# Patient Record
Sex: FEMALE | URBAN - METROPOLITAN AREA
[De-identification: names, ages, dates, MRNs, and addresses within clinical notes are randomized per-mention and may not be internally consistent; named-entity substitution may affect disease eponyms.]

---

## 2019-10-30 ENCOUNTER — OFFICE VISIT (OUTPATIENT)
Dept: URBAN - METROPOLITAN AREA CLINIC 23 | Facility: CLINIC | Age: 69
End: 2019-10-30
Payer: COMMERCIAL

## 2019-10-30 PROCEDURE — 99203 OFFICE O/P NEW LOW 30 MIN: CPT | Performed by: OPHTHALMOLOGY

## 2019-10-30 ASSESSMENT — VISUAL ACUITY
OS: 20/60
OD: 20/25

## 2019-10-30 ASSESSMENT — KERATOMETRY
OS: 41.63
OD: 41.75

## 2019-10-30 ASSESSMENT — INTRAOCULAR PRESSURE
OD: 11
OS: 12

## 2019-10-30 NOTE — IMPRESSION/PLAN
Impression: partial Retinal detachment with single break, left eye: H33.012. Mac on Plan: Discussed diagnosis in detail with patient. Advised patient of condition. Consult recommended [[facility] Called over to [facility] [address] to get patient set up for consult and surgery.

## 2019-10-30 NOTE — IMPRESSION/PLAN
Impression: Age-related nuclear cataract, bilateral: H25.13. Condition: established, worsening. Plan: Discussed diagnosis in detail with patient. No surgical treatment currently recommended. The patient will monitor vision changes and contact us with any decrease in vision.

## 2019-10-31 ENCOUNTER — SURGERY (OUTPATIENT)
Dept: URBAN - METROPOLITAN AREA SURGERY 15 | Facility: SURGERY | Age: 69
End: 2019-10-31
Payer: COMMERCIAL

## 2019-10-31 ENCOUNTER — OFFICE VISIT (OUTPATIENT)
Dept: URBAN - METROPOLITAN AREA CLINIC 33 | Facility: CLINIC | Age: 69
End: 2019-10-31
Payer: COMMERCIAL

## 2019-10-31 DIAGNOSIS — H33.012 RETINAL DETACHMENT WITH SINGLE BREAK, LEFT EYE: Primary | ICD-10-CM

## 2019-10-31 PROCEDURE — 92014 COMPRE OPH EXAM EST PT 1/>: CPT | Performed by: OPHTHALMOLOGY

## 2019-10-31 PROCEDURE — 92134 CPTRZ OPH DX IMG PST SGM RTA: CPT | Performed by: OPHTHALMOLOGY

## 2019-10-31 PROCEDURE — 67108 REPAIR DETACHED RETINA: CPT | Performed by: OPHTHALMOLOGY

## 2019-10-31 RX ORDER — OFLOXACIN 3 MG/ML
0.3 % SOLUTION/ DROPS OPHTHALMIC
Qty: 5 | Refills: 3 | Status: INACTIVE
Start: 2019-10-31 | End: 2020-02-11

## 2019-10-31 RX ORDER — PREDNISOLONE ACETATE 10 MG/ML
1 % SUSPENSION/ DROPS OPHTHALMIC
Qty: 10 | Refills: 5 | Status: INACTIVE
Start: 2019-10-31 | End: 2020-02-11

## 2019-10-31 ASSESSMENT — INTRAOCULAR PRESSURE
OD: 13
OS: 13
OS: 13
OD: 13

## 2019-10-31 NOTE — IMPRESSION/PLAN
Impression: Retinal detachment with single break, left eye: H33.012. OS
OS Condition: new problem addtl w/u needed. Vision: vision threatening. Plan: Discussed diagnosis in detail with patient. Discussed risks of progression. Surgical treatment is recommended to repair the retina PPVx. Surgical risks and benefits were discussed, explained and understood by patient. Unable to tell how much vision will be recovered. Discussed gas bubble and post-op care: no traveling, flying or high altitude for approximately 6 - 8 weeks. All questions answered. Patient elects to proceed with recommendation. RL1. Educational material provided to patient. OCT shows stable and attached centrally OS, stable OD Schedule Emergency Surgery today at [facility]. 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

## 2019-11-01 ENCOUNTER — POST-OPERATIVE VISIT (OUTPATIENT)
Dept: URBAN - METROPOLITAN AREA CLINIC 23 | Facility: CLINIC | Age: 69
End: 2019-11-01

## 2019-11-01 ASSESSMENT — INTRAOCULAR PRESSURE
OS: 20
OD: 14

## 2019-11-07 ENCOUNTER — POST-OPERATIVE VISIT (OUTPATIENT)
Dept: URBAN - METROPOLITAN AREA CLINIC 23 | Facility: CLINIC | Age: 69
End: 2019-11-07

## 2019-11-07 DIAGNOSIS — Z09 ENCNTR FOR F/U EXAM AFT TRTMT FOR COND OTH THAN MALIG NEOPLM: Primary | ICD-10-CM

## 2019-11-07 PROCEDURE — 99024 POSTOP FOLLOW-UP VISIT: CPT | Performed by: OPHTHALMOLOGY

## 2019-11-07 ASSESSMENT — INTRAOCULAR PRESSURE
OS: 17
OD: 13

## 2019-12-11 ENCOUNTER — POST-OPERATIVE VISIT (OUTPATIENT)
Dept: URBAN - METROPOLITAN AREA CLINIC 23 | Facility: CLINIC | Age: 69
End: 2019-12-11

## 2019-12-11 PROCEDURE — 99024 POSTOP FOLLOW-UP VISIT: CPT | Performed by: OPHTHALMOLOGY

## 2019-12-11 ASSESSMENT — INTRAOCULAR PRESSURE
OD: 13
OS: 16

## 2020-01-24 ENCOUNTER — POST-OPERATIVE VISIT (OUTPATIENT)
Dept: URBAN - METROPOLITAN AREA CLINIC 23 | Facility: CLINIC | Age: 70
End: 2020-01-24

## 2020-01-24 PROCEDURE — 99024 POSTOP FOLLOW-UP VISIT: CPT | Performed by: OPHTHALMOLOGY

## 2020-01-24 ASSESSMENT — INTRAOCULAR PRESSURE
OS: 14
OD: 13

## 2020-02-11 ENCOUNTER — OFFICE VISIT (OUTPATIENT)
Dept: URBAN - METROPOLITAN AREA CLINIC 23 | Facility: CLINIC | Age: 70
End: 2020-02-11
Payer: COMMERCIAL

## 2020-02-11 DIAGNOSIS — H52.03 HYPERMETROPIA, BILATERAL: Primary | ICD-10-CM

## 2020-02-11 PROCEDURE — 92012 INTRM OPH EXAM EST PATIENT: CPT | Performed by: OPTOMETRIST

## 2020-02-11 ASSESSMENT — KERATOMETRY
OD: 41.75
OS: 41.88

## 2020-02-11 ASSESSMENT — VISUAL ACUITY
OS: 20/100
OD: 20/20

## 2020-02-11 NOTE — IMPRESSION/PLAN
Impression: Hypermetropia, bilateral: H52.03. Plan: Discussed diagnosis in detail with patient. Reassured patient of current condition and treatment. Will continue to observe condition and or symptoms. New glasses Rx was given today.

## 2021-01-21 ENCOUNTER — OFFICE VISIT (OUTPATIENT)
Dept: URBAN - METROPOLITAN AREA CLINIC 23 | Facility: CLINIC | Age: 71
End: 2021-01-21
Payer: MEDICARE

## 2021-01-21 DIAGNOSIS — H11.32 SUBCONJUNCTIVAL HEMORRHAGE OF LEFT EYE: Primary | ICD-10-CM

## 2021-01-21 DIAGNOSIS — H25.13 AGE-RELATED NUCLEAR CATARACT, BILATERAL: ICD-10-CM

## 2021-01-21 PROCEDURE — 99212 OFFICE O/P EST SF 10 MIN: CPT | Performed by: OPTOMETRIST

## 2021-01-21 NOTE — IMPRESSION/PLAN
Impression: Age-related nuclear cataract, bilateral: H25.13. Plan: Cataracts account for the patient's complaints. Discussed all risks, benefits, procedures and recovery. Patient understands changing glasses will not improve vision. Patient desires to have surgery, recommend phacoemulsification with intraocular lens. Patient declined surgery at this time. Ketty Morales

## 2021-01-21 NOTE — IMPRESSION/PLAN
Impression: Subconjunctival hemorrhage of left eye: H11.32. Plan: Discussed diagnosis in detail with patient. Reassured patient of current condition and treatment.

## 2021-12-01 ENCOUNTER — OFFICE VISIT (OUTPATIENT)
Dept: URBAN - METROPOLITAN AREA CLINIC 23 | Facility: CLINIC | Age: 71
End: 2021-12-01
Payer: MEDICARE

## 2021-12-01 DIAGNOSIS — H40.1120 PRIMARY OPEN-ANGLE GLAUCOMA, LEFT EYE, STAGE UNSPECIFIED: ICD-10-CM

## 2021-12-01 DIAGNOSIS — H40.021 OPEN ANGLE WITH BORDERLINE FINDINGS, HIGH RISK, RIGHT EYE: ICD-10-CM

## 2021-12-01 DIAGNOSIS — H31.092 OTHER CHORIORETINAL SCARS, LEFT EYE: ICD-10-CM

## 2021-12-01 PROCEDURE — 92133 CPTRZD OPH DX IMG PST SGM ON: CPT | Performed by: OPTOMETRIST

## 2021-12-01 PROCEDURE — 99214 OFFICE O/P EST MOD 30 MIN: CPT | Performed by: OPTOMETRIST

## 2021-12-01 ASSESSMENT — INTRAOCULAR PRESSURE
OD: 14
OS: 20

## 2021-12-01 ASSESSMENT — KERATOMETRY
OD: 41.50
OS: 41.88

## 2021-12-01 NOTE — IMPRESSION/PLAN
Impression: Age-related nuclear cataract, bilateral: H25.13 Bilateral. Condition: established, worsening. Symptoms: could improve with surgery. Vision: vision affected. Plan: Cataracts account for the patient's complaints. Patient understands changing glasses will not improve vision. Patient desires to have surgery, recommend surgical consult. Briefly discussed option for MIGS with Dr. Kameron Fonseca.

## 2021-12-01 NOTE — IMPRESSION/PLAN
Impression: Other chorioretinal scars, left eye: H31.092 Left. Condition: stable. Plan: Discussed findings. Scars from retinal detachment repair stable, no treatment necessary.

## 2021-12-01 NOTE — IMPRESSION/PLAN
Impression: Open angle with borderline findings, high risk, right eye: H40.021 Right. Condition: will continue to monitor.  Plan: See plan 2

## 2021-12-01 NOTE — IMPRESSION/PLAN
Impression: Primary open-angle glaucoma, left eye, stage unspecified: H40.1120 Left. Condition: unstable. Plan: Discussed findings, RNFL OCT ordered and reviewed. OHN appears to have damage and IOP is elevated in OS. Prescribed Franchesca, sample given. Patient to return for further testing to determine amount of glaucomatous damage.

## 2021-12-07 ENCOUNTER — PRE-OPERATIVE VISIT (OUTPATIENT)
Dept: URBAN - METROPOLITAN AREA CLINIC 23 | Facility: CLINIC | Age: 71
End: 2021-12-07
Payer: MEDICARE

## 2021-12-07 DIAGNOSIS — H25.11 AGE-RELATED NUCLEAR CATARACT, RIGHT EYE: ICD-10-CM

## 2021-12-07 DIAGNOSIS — H40.1131 BILATERAL PRIMARY OPEN-ANGLE GLAUCOMA, MILD STAGE: Primary | ICD-10-CM

## 2021-12-07 PROCEDURE — 99214 OFFICE O/P EST MOD 30 MIN: CPT | Performed by: OPHTHALMOLOGY

## 2021-12-07 ASSESSMENT — INTRAOCULAR PRESSURE
OS: 14
OD: 14

## 2021-12-07 NOTE — IMPRESSION/PLAN
Impression: Age-related nuclear cataract, right eye: H25.11. Plan: No treatment required at this time.

## 2021-12-07 NOTE — IMPRESSION/PLAN
Impression: Age-related nuclear cataract, left eye: H25.12. Plan: Discussed cataract diagnosis with the patient. Discussed risks, benefits and alternatives to surgery including but not limited to: bleeding, infection, risk of vision loss, loss of the eye, need for other surgery. Patient voiced understanding and wishes to proceed. Patient elects surgical treatment. Patient desires surgery OU. Patient understands the need for glasses after surgery for BCVA. MIGS Discussed with pt limitations of MIGS device and it does not replace  Glaucoma eye drops and would have to continue treatment and would still need glasses after surgery. Understands possible use of iris stretch. Risk Level: 1 Left eye second(PC IOL (Standard W/ Hydrus )(AIM: PL ) (+)DEXY (+)BLOCK  (NO ORA, NO LRI,  NO LENSX)
(patient can upgrade to toric) 20 Minutes  Patient understands higher risk of complication and delayed healing due to dense cataract

## 2021-12-07 NOTE — IMPRESSION/PLAN
Impression: Bilateral primary open-angle glaucoma, mild stage: H40.1131. Plan: Pt has Glaucoma    Gonio :        Pachs:      Today's IOP : 14/14        Tmax  :  1420 Target IOP low to mid teens Pt denies Fhx of Glaucoma Right eye is the better seeing eye Last vf No VF on file (update after cataract surgery) C/D:  0.6x0.6/0.7x0.7 FT*51 Pt denies Sulfa Allergy   // Pt denies Lung /Heart dx Plan :
1. Continue Latanoprost QHS OU 2.  Recommend Cat w/MIGS

## 2021-12-28 ENCOUNTER — PRE-OPERATIVE VISIT (OUTPATIENT)
Dept: URBAN - METROPOLITAN AREA CLINIC 23 | Facility: CLINIC | Age: 71
End: 2021-12-28
Payer: MEDICARE

## 2021-12-28 DIAGNOSIS — H25.12 AGE-RELATED NUCLEAR CATARACT, LEFT EYE: Primary | ICD-10-CM

## 2021-12-28 ASSESSMENT — PACHYMETRY
OD: 2.69
OD: 24.06
OS: 2.48
OS: 24.25

## 2022-01-05 ENCOUNTER — OFFICE VISIT (OUTPATIENT)
Dept: URBAN - METROPOLITAN AREA CLINIC 23 | Facility: CLINIC | Age: 72
End: 2022-01-05
Payer: MEDICARE

## 2022-01-05 DIAGNOSIS — H40.1122 PRIMARY OPEN-ANGLE GLAUCOMA, MODERATE STAGE, LEFT EYE: ICD-10-CM

## 2022-01-05 DIAGNOSIS — H40.1111 PRIMARY OPEN-ANGLE GLAUCOMA, MILD STAGE, RIGHT EYE: Primary | ICD-10-CM

## 2022-01-05 DIAGNOSIS — H04.123 DRY EYE SYNDROME OF BILATERAL LACRIMAL GLANDS: ICD-10-CM

## 2022-01-05 PROCEDURE — 92083 EXTENDED VISUAL FIELD XM: CPT | Performed by: OPTOMETRIST

## 2022-01-05 PROCEDURE — 99213 OFFICE O/P EST LOW 20 MIN: CPT | Performed by: OPTOMETRIST

## 2022-01-05 RX ORDER — BIMATOPROST 0.1 MG/ML
0.01 % SOLUTION/ DROPS OPHTHALMIC
Qty: 0 | Refills: 0 | Status: INACTIVE
Start: 2021-12-01 | End: 2022-01-26

## 2022-01-05 ASSESSMENT — INTRAOCULAR PRESSURE
OD: 11
OS: 12

## 2022-01-05 NOTE — IMPRESSION/PLAN
Impression: Dry eye syndrome of bilateral lacrimal glands: H04.123 Bilateral. Plan: Discussed findings. Recommend patient use artificial tears 5 minutes before and after instilling Latanoprost along with gel at bedtime.

## 2022-01-05 NOTE — IMPRESSION/PLAN
Impression: Primary open-angle glaucoma, mild stage, right eye: H40.1111. Condition: established, stable. Plan: Pt has Glaucoma         Pachs:      Today's IOP :   11/12      Tmax  : 14/20 Target IOP low to mid teens Pt denies Fhx of Glaucoma Right eye is the better seeing eye VF: 01/05/2022, no pattern OD / dense superior arcuate OS
C/D: .6 round / .7 round OCT: 12/01/2021 DE: 12/07/2021 Pt denies Sulfa Allergy   // Pt denies Lung /Heart dx Plan :
1. Continue Latanoprost QHS OU 2. Discussed that Latanoprost may be causing irritation to corneal surface. Continue drop at this time, IOP and ONH stable. No changes to treatment warranted.

## 2022-01-05 NOTE — IMPRESSION/PLAN
Impression: Age-related nuclear cataract of left eye: H25.12 Left. Condition: severe. Plan: Discussed findings. Continue as scheduled for surgery with Dr. Rolo Singh.

## 2022-01-05 NOTE — IMPRESSION/PLAN
Impression: Primary open-angle glaucoma, moderate stage, left eye: H40.1122. Left. Condition: established, stable.  Plan: See plan 1

## 2022-01-25 ENCOUNTER — SURGERY (OUTPATIENT)
Dept: URBAN - METROPOLITAN AREA SURGERY 11 | Facility: SURGERY | Age: 72
End: 2022-01-25
Payer: MEDICARE

## 2022-01-25 PROCEDURE — 66989 XCPSL CTRC RMVL CPLX INSJ 1+: CPT | Performed by: OPHTHALMOLOGY

## 2022-01-26 ENCOUNTER — POST-OPERATIVE VISIT (OUTPATIENT)
Dept: URBAN - METROPOLITAN AREA CLINIC 23 | Facility: CLINIC | Age: 72
End: 2022-01-26

## 2022-01-26 PROCEDURE — 99024 POSTOP FOLLOW-UP VISIT: CPT | Performed by: OPTOMETRIST

## 2022-01-26 RX ORDER — LATANOPROST 50 UG/ML
0.005 % SOLUTION OPHTHALMIC
Qty: 7.5 | Refills: 3 | Status: ACTIVE
Start: 2022-01-26

## 2022-01-26 ASSESSMENT — INTRAOCULAR PRESSURE
OD: 15
OS: 10

## 2022-01-26 NOTE — IMPRESSION/PLAN
Impression: S/P Cataract Extraction by phacoemulsification with IOL placement; Shunt:  Hyrdrus OS - 1 Day. Encounter for surgical aftercare following surgery on a sense organ  Z48.810. Plan: Return as scheduled, sooner if vision suddenly changes or pain increases. Discussed pt. may see bump on conjunctiva -DEXYCU. She is aware that she will likely need laser OS in the coming months.  continue latanoprost

## 2022-02-02 ENCOUNTER — POST-OPERATIVE VISIT (OUTPATIENT)
Dept: URBAN - METROPOLITAN AREA CLINIC 23 | Facility: CLINIC | Age: 72
End: 2022-02-02

## 2022-02-02 PROCEDURE — 99024 POSTOP FOLLOW-UP VISIT: CPT | Performed by: OPTOMETRIST

## 2022-02-02 ASSESSMENT — INTRAOCULAR PRESSURE
OD: 8
OS: 8

## 2022-02-02 NOTE — IMPRESSION/PLAN
Impression: S/P Cataract Extraction by phacoemulsification with IOL placement; Shunt:  Hyrdrus OS - 8 Days. Encounter for surgical aftercare following surgery on a sense organ  Z48.810. Plan: Pt edu on all findings. Appropriate appearance and IOP at today's visit. Start AT's PRN OU for comfort. Continue Latanoprost as prescribed.  RTC 3-4 weeks w/ Dr. Catalina Melgar

## 2022-03-03 ENCOUNTER — POST-OPERATIVE VISIT (OUTPATIENT)
Dept: URBAN - METROPOLITAN AREA CLINIC 23 | Facility: CLINIC | Age: 72
End: 2022-03-03

## 2022-03-03 DIAGNOSIS — Z48.810 ENCOUNTER FOR SURGICAL AFTERCARE FOLLOWING SURGERY ON A SENSE ORGAN: Primary | ICD-10-CM

## 2022-03-03 PROCEDURE — 99024 POSTOP FOLLOW-UP VISIT: CPT | Performed by: OPTOMETRIST

## 2022-03-03 ASSESSMENT — VISUAL ACUITY
OD: 20/40
OS: 20/50

## 2022-03-03 ASSESSMENT — INTRAOCULAR PRESSURE
OS: 10
OD: 10

## 2022-03-03 NOTE — IMPRESSION/PLAN
Impression: S/P Cataract Extraction by phacoemulsification with IOL placement; Shunt:  Hyrdrus OS - 37 Days. Encounter for surgical aftercare following surgery on a sense organ  Z48.810. Post operative instructions reviewed - Opacified Capsule OS. Plan: Discussed findings. PCO seen in OS, recommend YAG capsulotomy. Patient to continue Latanoprost QHS OD, advised patient to d/c Latanoprost 1 week before YAG laser in OS to determine whether IOP changes. Revisit use of glaucoma drop in OS at YAG PO. New glasses rx given.  --Continue Latanoprost QHS OU

## 2022-05-03 ENCOUNTER — SURGERY (OUTPATIENT)
Dept: URBAN - METROPOLITAN AREA SURGERY 11 | Facility: SURGERY | Age: 72
End: 2022-05-03
Payer: MEDICARE

## 2022-05-03 DIAGNOSIS — H26.492 OTHER SECONDARY CATARACT, LEFT EYE: Primary | ICD-10-CM

## 2022-05-03 PROCEDURE — 66821 AFTER CATARACT LASER SURGERY: CPT | Performed by: OPHTHALMOLOGY

## 2022-06-22 ENCOUNTER — POST-OPERATIVE VISIT (OUTPATIENT)
Dept: URBAN - METROPOLITAN AREA CLINIC 23 | Facility: CLINIC | Age: 72
End: 2022-06-22
Payer: MEDICARE

## 2022-06-22 DIAGNOSIS — Z48.810 ENCOUNTER FOR SURGICAL AFTERCARE FOLLOWING SURGERY ON A SENSE ORGAN: Primary | ICD-10-CM

## 2022-06-22 PROCEDURE — 99024 POSTOP FOLLOW-UP VISIT: CPT | Performed by: OPTOMETRIST

## 2022-06-22 ASSESSMENT — INTRAOCULAR PRESSURE
OD: 10
OS: 10

## 2022-06-22 NOTE — IMPRESSION/PLAN
Impression: S/P YAG Capsulotomy (Yttrium Aluminum [place]) OS - 50 Days. Encounter for surgical aftercare following surgery on a sense organ  Z48.810. Post operative instructions reviewed - Condition is improving - Plan: Discussed findings. Advised patient that blind spot in vision is likely due to glaucomatous damage. Re-evaluate use of Latanoprost in OS at next visit. Call with any sooner vision changes. *Dr. Dusty Marshall will check IOP.